# Patient Record
Sex: FEMALE | URBAN - METROPOLITAN AREA
[De-identification: names, ages, dates, MRNs, and addresses within clinical notes are randomized per-mention and may not be internally consistent; named-entity substitution may affect disease eponyms.]

---

## 2022-03-31 ENCOUNTER — APPOINTMENT (OUTPATIENT)
Dept: URBAN - METROPOLITAN AREA CLINIC 291 | Age: 32
Setting detail: DERMATOLOGY
End: 2022-03-31

## 2022-03-31 DIAGNOSIS — L90.5 SCAR CONDITIONS AND FIBROSIS OF SKIN: ICD-10-CM

## 2022-03-31 DIAGNOSIS — L72.8 OTHER FOLLICULAR CYSTS OF THE SKIN AND SUBCUTANEOUS TISSUE: ICD-10-CM

## 2022-03-31 PROCEDURE — OTHER COUNSELING: OTHER

## 2022-03-31 PROCEDURE — OTHER TREATMENT REGIMEN: OTHER

## 2022-03-31 PROCEDURE — OTHER OTHER: OTHER

## 2022-03-31 PROCEDURE — OTHER PRESCRIPTION: OTHER

## 2022-03-31 PROCEDURE — 99203 OFFICE O/P NEW LOW 30 MIN: CPT

## 2022-03-31 PROCEDURE — OTHER MIPS QUALITY: OTHER

## 2022-03-31 RX ORDER — CLINDAMYCIN PHOSPHATE 10 MG/ML
SOLUTION TOPICAL
Qty: 60 | Refills: 1 | Status: ERX | COMMUNITY
Start: 2022-03-31

## 2022-03-31 ASSESSMENT — LOCATION ZONE DERM: LOCATION ZONE: TRUNK

## 2022-03-31 ASSESSMENT — LOCATION SIMPLE DESCRIPTION DERM: LOCATION SIMPLE: GROIN

## 2022-03-31 ASSESSMENT — LOCATION DETAILED DESCRIPTION DERM: LOCATION DETAILED: LEFT INGUINAL CREASE

## 2022-03-31 NOTE — HPI: SKIN LESION (LIPOMA)
How Severe Is Your Lipoma?: moderate
Is This A New Presentation, Or A Follow-Up?: Skin Lesion
Additional History: Patient had a “fatty tissue” mass removed by a surgeon 5 years ago.  She has since developed a lesion above the scar area.  It has drained in the past and been tender at times.  Her PCP treated her with a course of oral antibiotics which did not resolve lesion.

## 2022-03-31 NOTE — PROCEDURE: TREATMENT REGIMEN
Plan: Reassured no sign of acute infection today.  Patient states the lesion can get tender and would like it removed.  Due to location and proximity to previous surgical site, refer to [facility].  Patient states the lesion that was previously removed was a fatty tumor and she had an ultrasound - advised she may need to obtain these records for the surgeons\\nRTC for any pain, redness or drainage from site
Initiate Treatment: Cleocin solution twice daily
Detail Level: Zone

## 2022-03-31 NOTE — PROCEDURE: OTHER
Other (Free Text): From prior lesion removal.
Render Risk Assessment In Note?: no
Note Text (......Xxx Chief Complaint.): This diagnosis correlates with the
Detail Level: Zone